# Patient Record
Sex: MALE | NOT HISPANIC OR LATINO | Employment: UNEMPLOYED | URBAN - NONMETROPOLITAN AREA
[De-identification: names, ages, dates, MRNs, and addresses within clinical notes are randomized per-mention and may not be internally consistent; named-entity substitution may affect disease eponyms.]

---

## 2023-05-19 NOTE — PROGRESS NOTES
Subjective     Lavon Emerson is a 52 y.o. male who presents for Follow-up (ER follow up/abscess).      HPI  The patient is a 52 year-old male presenting to the clinic for follow up emergency visit.  He had an abscess on the left buttock and incision and drainage was done.  Wound was examined.  Wound was healing well.  Packing was removed.  Did not appear to be repacked cleaned with Betadine.  Covered with a Band-Aid.  Wound care instructions were given.  Educated on obesity and diet and exercise.  Advised lose weight.  Discussed about skin infection.  Continue current medication.  Complaining skin rash in the armpits and groin area.  Discussed about intertrigo.  Discussed medication.  Educated on hypothyroidism.  Educated on erectile dysfunction and discussed medication.  Does not wish to go to the urologist.  Educated on dyslipidemia and diet and exercise.  Complaining feeling tired but advised on diet and exercise but educated on vitamin D deficiency.        Review of Systems  Review of systems:    General:  Denies fever.  Denies chills.    HEENT: Denies nasal congestion.  Denies sinus pressure.    Respiratory:  Denies cough.  Denies shortness of breath.    Cardiovascular:  Denies chest pain.  Denies heart palpitations.  Denies shortness of breath.    Gastrointestinal:  Denies nausea vomiting diarrhea.  Denies abdominal pain.    Genitourinary: Denies burning urination.  Denies frequent urination.  Denies flank pain.  Denies blood in the urine.     Neurology:  Denies tingling numbness but denies weakness.  Denies headache.  Denies blurred vision.    Musculoskeletal:  Denies body aches.  Denies joint pains.  Denies muscle aches.  Denies muscle weakness    Endocrinology:  Denies cold intolerance.  Denies hot intolerance.    Psychiatric:  Denies depression.  Denies anxiety.  Denies suicidal.  Denies homicidal.  Skin.  Dictated as above      Objective   /70 (BP Location: Left arm, Patient Position: Sitting, BP  "Cuff Size: Large adult)   Pulse 86   Ht 1.905 m (6' 3\")   Wt (!) 166 kg (365 lb)   SpO2 98%   BMI 45.62 kg/m²        Physical Exam  General.  Not in distress.  HEENT normocephalic anicteric sclerae.  Neck soft supple no thyromegaly.  No carotid bruit.  Lungs are clear.  Heart regular.  Abdomen soft nontender nondistended bowel sounds are positive.  Extremities no clubbing cyanosis or edema.  Psychiatric.  Has good eye contact.  No crying spells noted.  Speech was normal.  Denies depression.  Denies suicidal.  Denies homicidal.  Skin exam.  Wound dictated as above.  Skin rash in the axilla and in the groin area consistent with intertrigo    Assessment/Plan   1.  Abscess/infection.  Wound care instructions were given.  Continue current management.    2.  Obesity.  Educated on diet exercise.  Advised to lose weight.    3.  Intertrigo.  Educated on skin care.  Explained adverse effects of medication.    4.  Hypogonadism.  Dictated as above.    5.  Male erectile dysfunction.  Caution advised about medication including combination effect with nitroglycerin, priapism and sudden sensorineural hearing loss.    6.  Dyslipidemia.  Educated on diet exercise.  We will continue monitor    7.  Fatigue.  Educated on diet exercise.  Advised to lose weight.    8.  Vitamin D deficiency.  Educated on vitamin D deficiency we will continue monitor.                Problem List Items Addressed This Visit          Endocrine/Metabolic    Hypogonadism in male    Vitamin D deficiency       Other    Abscess - Primary    Chronic fatigue    Dyslipidemia     Other Visit Diagnoses       Class 3 severe obesity due to excess calories with serious comorbidity and body mass index (BMI) of 45.0 to 49.9 in adult (CMS/[facility])        Skin infection        Intertrigo        Relevant Medications    clotrimazole-betamethasone (Lotrisone) cream    Other male erectile dysfunction        Relevant Medications    tadalafil (Cialis) 2.5 mg tablet    sildenafil " (Viagra) 100 mg tablet        Scribe Attestation  By signing my name below, I, Ezio Constantino   attest that this documentation has been prepared under the direction and in the presence of Robin Caballero MD.

## 2023-05-22 ENCOUNTER — OFFICE VISIT (OUTPATIENT)
Dept: PRIMARY CARE | Facility: CLINIC | Age: 53
End: 2023-05-22
Payer: COMMERCIAL

## 2023-05-22 VITALS
SYSTOLIC BLOOD PRESSURE: 111 MMHG | DIASTOLIC BLOOD PRESSURE: 70 MMHG | WEIGHT: 315 LBS | OXYGEN SATURATION: 98 % | HEIGHT: 75 IN | HEART RATE: 86 BPM | BODY MASS INDEX: 39.17 KG/M2

## 2023-05-22 DIAGNOSIS — E66.01 CLASS 3 SEVERE OBESITY DUE TO EXCESS CALORIES WITH SERIOUS COMORBIDITY AND BODY MASS INDEX (BMI) OF 45.0 TO 49.9 IN ADULT (MULTI): ICD-10-CM

## 2023-05-22 DIAGNOSIS — L08.9 SKIN INFECTION: ICD-10-CM

## 2023-05-22 DIAGNOSIS — R53.82 CHRONIC FATIGUE: ICD-10-CM

## 2023-05-22 DIAGNOSIS — N52.8 OTHER MALE ERECTILE DYSFUNCTION: ICD-10-CM

## 2023-05-22 DIAGNOSIS — L30.4 INTERTRIGO: ICD-10-CM

## 2023-05-22 DIAGNOSIS — E78.5 DYSLIPIDEMIA: ICD-10-CM

## 2023-05-22 DIAGNOSIS — E55.9 VITAMIN D DEFICIENCY: ICD-10-CM

## 2023-05-22 DIAGNOSIS — E29.1 HYPOGONADISM IN MALE: ICD-10-CM

## 2023-05-22 DIAGNOSIS — L02.91 ABSCESS: Primary | ICD-10-CM

## 2023-05-22 PROBLEM — D51.0 PERNICIOUS ANEMIA: Status: ACTIVE | Noted: 2023-05-22

## 2023-05-22 PROBLEM — M54.50 CHRONIC LOW BACK PAIN: Status: ACTIVE | Noted: 2023-05-22

## 2023-05-22 PROBLEM — G89.29 CHRONIC LOW BACK PAIN: Status: ACTIVE | Noted: 2023-05-22

## 2023-05-22 PROBLEM — G43.909 MIGRAINES: Status: ACTIVE | Noted: 2023-05-22

## 2023-05-22 PROBLEM — S16.1XXA ACUTE STRAIN OF NECK MUSCLE: Status: ACTIVE | Noted: 2023-05-22

## 2023-05-22 PROBLEM — H93.13 TINNITUS, BILATERAL: Status: ACTIVE | Noted: 2023-05-22

## 2023-05-22 PROBLEM — G47.33 OSA (OBSTRUCTIVE SLEEP APNEA): Status: ACTIVE | Noted: 2023-05-22

## 2023-05-22 PROBLEM — K21.9 GASTROESOPHAGEAL REFLUX DISEASE: Status: ACTIVE | Noted: 2023-05-22

## 2023-05-22 PROCEDURE — 99213 OFFICE O/P EST LOW 20 MIN: CPT | Performed by: FAMILY MEDICINE

## 2023-05-22 PROCEDURE — 1036F TOBACCO NON-USER: CPT | Performed by: FAMILY MEDICINE

## 2023-05-22 PROCEDURE — 3008F BODY MASS INDEX DOCD: CPT | Performed by: FAMILY MEDICINE

## 2023-05-22 RX ORDER — SILDENAFIL 100 MG/1
100 TABLET, FILM COATED ORAL DAILY PRN
Qty: 12 TABLET | Refills: 0 | Status: SHIPPED | OUTPATIENT
Start: 2023-05-22 | End: 2024-05-21

## 2023-05-22 RX ORDER — TRAMADOL HYDROCHLORIDE 50 MG/1
50 TABLET, COATED ORAL EVERY 6 HOURS PRN
COMMUNITY
Start: 2023-05-19

## 2023-05-22 RX ORDER — CLINDAMYCIN HYDROCHLORIDE 300 MG/1
300 CAPSULE ORAL EVERY 6 HOURS
COMMUNITY
Start: 2023-05-19 | End: 2023-05-25

## 2023-05-22 RX ORDER — TADALAFIL 2.5 MG/1
2.5 TABLET ORAL DAILY
Qty: 30 TABLET | Refills: 0 | Status: SHIPPED | OUTPATIENT
Start: 2023-05-22

## 2023-05-22 RX ORDER — CLOTRIMAZOLE AND BETAMETHASONE DIPROPIONATE 10; .64 MG/G; MG/G
CREAM TOPICAL
Qty: 60 G | Refills: 0 | Status: SHIPPED | OUTPATIENT
Start: 2023-05-22

## 2023-05-22 ASSESSMENT — PAIN SCALES - GENERAL: PAINLEVEL: 0-NO PAIN

## 2023-11-07 ENCOUNTER — APPOINTMENT (OUTPATIENT)
Dept: PRIMARY CARE | Facility: CLINIC | Age: 53
End: 2023-11-07
Payer: COMMERCIAL

## 2023-11-16 ENCOUNTER — APPOINTMENT (OUTPATIENT)
Dept: PRIMARY CARE | Facility: CLINIC | Age: 53
End: 2023-11-16
Payer: COMMERCIAL

## 2023-12-08 PROBLEM — I82.409 ACUTE EMBOLISM AND THROMBOSIS OF UNSPECIFIED DEEP VEINS OF UNSPECIFIED LOWER EXTREMITY (MULTI): Status: ACTIVE | Noted: 2023-12-08

## 2023-12-08 PROBLEM — E03.9 HYPOTHYROIDISM: Status: ACTIVE | Noted: 2023-12-08

## 2023-12-08 PROBLEM — S69.90XA INJURY OF FINGER: Status: ACTIVE | Noted: 2023-12-08

## 2023-12-08 PROBLEM — T23.231A: Status: ACTIVE | Noted: 2023-12-08
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
## 2023-12-08 PROBLEM — I82.409 DEEP VENOUS THROMBOSIS (MULTI): Status: ACTIVE | Noted: 2023-12-08

## 2023-12-08 PROBLEM — R07.9 CHEST PAIN: Status: ACTIVE | Noted: 2023-12-08

## 2023-12-08 PROBLEM — S82.309A CLOSED FRACTURE OF DISTAL END OF TIBIA: Status: ACTIVE | Noted: 2023-12-08

## 2023-12-08 PROBLEM — R06.00 DYSPNEA: Status: ACTIVE | Noted: 2023-12-08

## 2023-12-08 PROBLEM — S61.411A LACERATION OF RIGHT HAND WITHOUT FOREIGN BODY: Status: ACTIVE | Noted: 2023-12-08

## 2023-12-08 PROBLEM — M79.606 PAIN OF LOWER EXTREMITY: Status: ACTIVE | Noted: 2023-12-08

## 2023-12-08 PROBLEM — S89.90XA INJURY OF LOWER EXTREMITY: Status: ACTIVE | Noted: 2023-12-08

## 2023-12-08 PROBLEM — Z86.718 HISTORY OF DEEP VEIN THROMBOSIS: Status: ACTIVE | Noted: 2023-12-08

## 2024-08-20 ENCOUNTER — APPOINTMENT (OUTPATIENT)
Dept: CARDIOLOGY | Facility: HOSPITAL | Age: 54
End: 2024-08-20

## 2024-08-20 ENCOUNTER — HOSPITAL ENCOUNTER (EMERGENCY)
Facility: HOSPITAL | Age: 54
Discharge: HOME | End: 2024-08-20
Attending: EMERGENCY MEDICINE

## 2024-08-20 ENCOUNTER — APPOINTMENT (OUTPATIENT)
Dept: RADIOLOGY | Facility: HOSPITAL | Age: 54
End: 2024-08-20

## 2024-08-20 ENCOUNTER — PHARMACY VISIT (OUTPATIENT)
Dept: PHARMACY | Facility: CLINIC | Age: 54
End: 2024-08-20

## 2024-08-20 VITALS
HEIGHT: 75 IN | RESPIRATION RATE: 16 BRPM | OXYGEN SATURATION: 97 % | WEIGHT: 315 LBS | TEMPERATURE: 97.8 F | HEART RATE: 73 BPM | DIASTOLIC BLOOD PRESSURE: 86 MMHG | SYSTOLIC BLOOD PRESSURE: 133 MMHG | BODY MASS INDEX: 39.17 KG/M2

## 2024-08-20 DIAGNOSIS — M25.512 ACUTE PAIN OF LEFT SHOULDER: Primary | ICD-10-CM

## 2024-08-20 LAB
ALBUMIN SERPL BCP-MCNC: 3.8 G/DL (ref 3.4–5)
ALP SERPL-CCNC: 69 U/L (ref 33–120)
ALT SERPL W P-5'-P-CCNC: 16 U/L (ref 10–52)
ANION GAP SERPL CALC-SCNC: 12 MMOL/L (ref 10–20)
APPEARANCE UR: CLEAR
AST SERPL W P-5'-P-CCNC: 17 U/L (ref 9–39)
BASOPHILS # BLD AUTO: 0.02 X10*3/UL (ref 0–0.1)
BASOPHILS NFR BLD AUTO: 0.3 %
BILIRUB SERPL-MCNC: 0.5 MG/DL (ref 0–1.2)
BILIRUB UR STRIP.AUTO-MCNC: NEGATIVE MG/DL
BUN SERPL-MCNC: 23 MG/DL (ref 6–23)
CALCIUM SERPL-MCNC: 9.1 MG/DL (ref 8.6–10.3)
CARDIAC TROPONIN I PNL SERPL HS: 3 NG/L (ref 0–20)
CARDIAC TROPONIN I PNL SERPL HS: 3 NG/L (ref 0–20)
CHLORIDE SERPL-SCNC: 104 MMOL/L (ref 98–107)
CO2 SERPL-SCNC: 26 MMOL/L (ref 21–32)
COLOR UR: NORMAL
CREAT SERPL-MCNC: 1.21 MG/DL (ref 0.5–1.3)
D DIMER PPP FEU-MCNC: 572 NG/ML FEU
EGFRCR SERPLBLD CKD-EPI 2021: 71 ML/MIN/1.73M*2
EOSINOPHIL # BLD AUTO: 0.15 X10*3/UL (ref 0–0.7)
EOSINOPHIL NFR BLD AUTO: 2.3 %
ERYTHROCYTE [DISTWIDTH] IN BLOOD BY AUTOMATED COUNT: 13.1 % (ref 11.5–14.5)
GLUCOSE SERPL-MCNC: 124 MG/DL (ref 74–99)
GLUCOSE UR STRIP.AUTO-MCNC: NORMAL MG/DL
HCT VFR BLD AUTO: 45.5 % (ref 41–52)
HGB BLD-MCNC: 15 G/DL (ref 13.5–17.5)
HOLD SPECIMEN: NORMAL
IMM GRANULOCYTES # BLD AUTO: 0.03 X10*3/UL (ref 0–0.7)
IMM GRANULOCYTES NFR BLD AUTO: 0.5 % (ref 0–0.9)
KETONES UR STRIP.AUTO-MCNC: NEGATIVE MG/DL
LEUKOCYTE ESTERASE UR QL STRIP.AUTO: NEGATIVE
LYMPHOCYTES # BLD AUTO: 2.55 X10*3/UL (ref 1.2–4.8)
LYMPHOCYTES NFR BLD AUTO: 39.5 %
MAGNESIUM SERPL-MCNC: 2.02 MG/DL (ref 1.6–2.4)
MCH RBC QN AUTO: 33.7 PG (ref 26–34)
MCHC RBC AUTO-ENTMCNC: 33 G/DL (ref 32–36)
MCV RBC AUTO: 102 FL (ref 80–100)
MONOCYTES # BLD AUTO: 0.66 X10*3/UL (ref 0.1–1)
MONOCYTES NFR BLD AUTO: 10.2 %
NEUTROPHILS # BLD AUTO: 3.04 X10*3/UL (ref 1.2–7.7)
NEUTROPHILS NFR BLD AUTO: 47.2 %
NITRITE UR QL STRIP.AUTO: NEGATIVE
NRBC BLD-RTO: 0 /100 WBCS (ref 0–0)
PH UR STRIP.AUTO: 6.5 [PH]
PLATELET # BLD AUTO: 251 X10*3/UL (ref 150–450)
POTASSIUM SERPL-SCNC: 4.3 MMOL/L (ref 3.5–5.3)
PROT SERPL-MCNC: 6.9 G/DL (ref 6.4–8.2)
PROT UR STRIP.AUTO-MCNC: NEGATIVE MG/DL
RBC # BLD AUTO: 4.45 X10*6/UL (ref 4.5–5.9)
RBC # UR STRIP.AUTO: NEGATIVE /UL
SODIUM SERPL-SCNC: 138 MMOL/L (ref 136–145)
SP GR UR STRIP.AUTO: 1.02
UROBILINOGEN UR STRIP.AUTO-MCNC: NORMAL MG/DL
WBC # BLD AUTO: 6.5 X10*3/UL (ref 4.4–11.3)

## 2024-08-20 PROCEDURE — 93971 EXTREMITY STUDY: CPT

## 2024-08-20 PROCEDURE — 93971 EXTREMITY STUDY: CPT | Performed by: RADIOLOGY

## 2024-08-20 PROCEDURE — 96374 THER/PROPH/DIAG INJ IV PUSH: CPT

## 2024-08-20 PROCEDURE — 99285 EMERGENCY DEPT VISIT HI MDM: CPT | Mod: 25

## 2024-08-20 PROCEDURE — 84075 ASSAY ALKALINE PHOSPHATASE: CPT | Performed by: EMERGENCY MEDICINE

## 2024-08-20 PROCEDURE — 84484 ASSAY OF TROPONIN QUANT: CPT | Performed by: EMERGENCY MEDICINE

## 2024-08-20 PROCEDURE — 93005 ELECTROCARDIOGRAM TRACING: CPT

## 2024-08-20 PROCEDURE — 36415 COLL VENOUS BLD VENIPUNCTURE: CPT | Performed by: EMERGENCY MEDICINE

## 2024-08-20 PROCEDURE — 81003 URINALYSIS AUTO W/O SCOPE: CPT | Performed by: EMERGENCY MEDICINE

## 2024-08-20 PROCEDURE — 83735 ASSAY OF MAGNESIUM: CPT | Performed by: EMERGENCY MEDICINE

## 2024-08-20 PROCEDURE — RXMED WILLOW AMBULATORY MEDICATION CHARGE

## 2024-08-20 PROCEDURE — 96375 TX/PRO/DX INJ NEW DRUG ADDON: CPT

## 2024-08-20 PROCEDURE — 85025 COMPLETE CBC W/AUTO DIFF WBC: CPT | Performed by: EMERGENCY MEDICINE

## 2024-08-20 PROCEDURE — 85379 FIBRIN DEGRADATION QUANT: CPT | Performed by: EMERGENCY MEDICINE

## 2024-08-20 PROCEDURE — 2500000004 HC RX 250 GENERAL PHARMACY W/ HCPCS (ALT 636 FOR OP/ED): Performed by: EMERGENCY MEDICINE

## 2024-08-20 RX ORDER — DEXAMETHASONE SODIUM PHOSPHATE 10 MG/ML
10 INJECTION INTRAMUSCULAR; INTRAVENOUS ONCE
Status: COMPLETED | OUTPATIENT
Start: 2024-08-20 | End: 2024-08-20

## 2024-08-20 RX ORDER — DEXAMETHASONE 6 MG/1
12 TABLET ORAL DAILY
Qty: 2 TABLET | Refills: 0 | Status: SHIPPED | OUTPATIENT
Start: 2024-08-20 | End: 2024-08-21

## 2024-08-20 RX ORDER — KETOROLAC TROMETHAMINE 15 MG/ML
15 INJECTION, SOLUTION INTRAMUSCULAR; INTRAVENOUS ONCE
Status: COMPLETED | OUTPATIENT
Start: 2024-08-20 | End: 2024-08-20

## 2024-08-20 ASSESSMENT — PAIN SCALES - GENERAL
PAINLEVEL_OUTOF10: 8
PAINLEVEL_OUTOF10: 5 - MODERATE PAIN

## 2024-08-20 ASSESSMENT — PAIN DESCRIPTION - LOCATION
LOCATION: SHOULDER
LOCATION: SHOULDER

## 2024-08-20 ASSESSMENT — PAIN DESCRIPTION - PAIN TYPE: TYPE: ACUTE PAIN

## 2024-08-20 ASSESSMENT — PAIN DESCRIPTION - ORIENTATION
ORIENTATION: LEFT
ORIENTATION: LEFT

## 2024-08-20 ASSESSMENT — PAIN - FUNCTIONAL ASSESSMENT: PAIN_FUNCTIONAL_ASSESSMENT: 0-10

## 2024-08-20 ASSESSMENT — COLUMBIA-SUICIDE SEVERITY RATING SCALE - C-SSRS
6. HAVE YOU EVER DONE ANYTHING, STARTED TO DO ANYTHING, OR PREPARED TO DO ANYTHING TO END YOUR LIFE?: NO
2. HAVE YOU ACTUALLY HAD ANY THOUGHTS OF KILLING YOURSELF?: NO
1. IN THE PAST MONTH, HAVE YOU WISHED YOU WERE DEAD OR WISHED YOU COULD GO TO SLEEP AND NOT WAKE UP?: NO

## 2024-08-20 ASSESSMENT — PAIN DESCRIPTION - DESCRIPTORS: DESCRIPTORS: DISCOMFORT

## 2024-08-20 NOTE — Clinical Note
Lavon Ki was seen and treated in our emergency department on 8/20/2024.  He may return to work on 08/21/2024.       If you have any questions or concerns, please don't hesitate to call.      Nikki POE MD

## 2024-08-20 NOTE — PROGRESS NOTES
Emergency Medicine Transition of Care Note.    I received Lavon Emerson in signout from Dr. CAMARENA.  Please see the previous ED provider note for all HPI, PE and MDM up to the time of signout at 0700. This is in addition to the primary record.    In brief Lavon Emerson is an 54 y.o. male presenting for   Chief Complaint   Patient presents with    Shoulder Pain     Pain on entire left side to touch     At the time of signout we were awaiting: US    ED Course as of 08/20/24 1451   Tue Aug 20, 2024   0655 EKG obtained at 633, interpreted by myself.  Normal sinus rhythm with a ventricular rate of 71, no axis deviation, normal intervals, with no acute ischemic changes [VT]   0805 EKG performed at 805 normal sinus rhythm ventricular rate of 66 no ST elevation or depression interpreted by me. [KA]      ED Course User Index  [KA] Kulwinder Willoughby DO  [VT] Nikki POE MD         Diagnoses as of 08/20/24 1451   Acute pain of left shoulder       Medical Decision Making      Final diagnoses:   [M25.512] Acute pain of left shoulder     54-year-old male presents to the ER with chief complaint of left arm shoulder pain.  Patient was worked up from a cardiac standpoint workup is negative.  Plan was to discharge patient home and he did ask me about his leg did feel abnormal this morning but that was only briefly and that resolved.  I do not think it is a stroke.  However it was going to be precautions to get an MRI.  Patient does not fit in our MRI machine.  Plan is to discharge the patient home and follow-up with the specialist patient would like to be discharged home he does not want to be transferred.      Procedure  Procedures    Kulwinder Willoughby DO

## 2024-08-20 NOTE — ED PROVIDER NOTES
"HPI   Chief Complaint   Patient presents with    Shoulder Pain     Pain on entire left side to touch       HPI  Patient is a 54-year-old male presenting to the ED today for pain to the left side of his body.  Patient explains that he has had pain to the left side of his body all night tonight, keeping him up from sleep.  Patient describes pain in his left shoulder, left neck, and left leg.  Patient notes that when he got into the shower this morning, his left leg felt like it was \"on fire\" from the water hitting it, prompting him to come to the ED for further evaluation.  He notes that last time he had similar symptoms several years ago, he was diagnosed with cellulitis.  He does report associated generalized weakness.  He otherwise denies any fever, chest pain, shortness of breath, abdominal pain, vomiting, diarrhea, numbness, or weakness.      Patient History   No past medical history on file.  Past Surgical History:   Procedure Laterality Date    MR HEAD ANGIO WO IV CONTRAST  5/31/2015    MR HEAD ANGIO WO IV CONTRAST [facility]    OTHER SURGICAL HISTORY  01/04/2022    Kidney surgery    OTHER SURGICAL HISTORY  03/02/2022    Knee surgery    OTHER SURGICAL HISTORY  03/02/2022    Hand surgery    OTHER SURGICAL HISTORY  03/02/2022    Back surgery    OTHER SURGICAL HISTORY  03/02/2022    Leg surgery    OTHER SURGICAL HISTORY  03/02/2022    Appendectomy     No family history on file.  Social History     Tobacco Use    Smoking status: Former     Current packs/day: 0.25     Average packs/day: 0.3 packs/day for 0.5 years (0.1 ttl pk-yrs)     Types: Cigarettes    Smokeless tobacco: Never   Vaping Use    Vaping status: Never Used   Substance Use Topics    Alcohol use: Not Currently    Drug use: Never       Physical Exam   ED Triage Vitals [08/20/24 0639]   Temperature Heart Rate Respirations BP   36.6 °C (97.8 °F) 71 18 155/80      Pulse Ox Temp Source Heart Rate Source Patient Position   97 % Axillary Monitor " Lying      BP Location FiO2 (%)     Left arm --       Physical Exam  Vitals and nursing note reviewed.   Constitutional:       General: He is not in acute distress.     Appearance: He is not toxic-appearing.   HENT:      Head: Normocephalic.      Mouth/Throat:      Mouth: Mucous membranes are moist.   Eyes:      Extraocular Movements: Extraocular movements intact.      Conjunctiva/sclera: Conjunctivae normal.   Cardiovascular:      Rate and Rhythm: Normal rate and regular rhythm.      Pulses: Normal pulses.      Comments: Tenderness to palpation of the left anterior chest wall  Pulmonary:      Effort: Pulmonary effort is normal. No respiratory distress.      Breath sounds: Normal breath sounds. No wheezing.   Abdominal:      General: There is no distension.      Palpations: Abdomen is soft.      Tenderness: There is no abdominal tenderness. There is no guarding or rebound.   Musculoskeletal:         General: No swelling.      Cervical back: Neck supple.      Comments: Tenderness to palpation of the proximal left upper extremity and left calf. No erythema or swelling. 2+ distal pulses in all 4 extremities. Normal sensation to light touch.   Skin:     General: Skin is warm and dry.      Capillary Refill: Capillary refill takes less than 2 seconds.   Neurological:      General: No focal deficit present.      Mental Status: He is alert. Mental status is at baseline.      Comments: This patient is awake, alert and oriented to person, place and time. Speech is clear and fluent. Cranial nerves II-XII are grossly intact. Strength and sensation are intact in all extremities.            ED Course & MDM   ED Course as of 08/20/24 0656   Tue Aug 20, 2024   0655 EKG obtained at 633, interpreted by myself.  Normal sinus rhythm with a ventricular rate of 71, no axis deviation, normal intervals, with no acute ischemic changes [VT]      ED Course User Index  [VT] Nikki POE MD                 No data recorded     [place] Coma  Scale Score: 15 (08/20/24 0642 : Sunni Suttno RN)                           Medical Decision Making  Patient was seen and evaluated for paresthesia to the left side of his body.  On arrival, vital signs are unremarkable.  On exam, patient has no focal neurological deficits.  Additional labwork and imaging are ordered for further evaluation of the patient's symptoms.    At this time, patient's work-up remains pending.  Patient will be signed out to the oncoming physician, Dr. Willoughby, pending the results of his work-up and further disposition.    Procedure  Procedures     Nikki POE MD  08/20/24 0656

## 2024-08-20 NOTE — ED TRIAGE NOTES
Patient comes in with left shoulder, arm, neck and leg paraesthesia that started when he woke up this morning. He was driving to work and started to feel like he has cellulitis flare up.  Denies any chest pain and sob.

## 2024-08-23 LAB
ATRIAL RATE: 66 BPM
ATRIAL RATE: 71 BPM
P AXIS: 15 DEGREES
P AXIS: 24 DEGREES
P OFFSET: 180 MS
P OFFSET: 191 MS
P ONSET: 118 MS
P ONSET: 130 MS
PR INTERVAL: 188 MS
PR INTERVAL: 208 MS
Q ONSET: 222 MS
Q ONSET: 224 MS
QRS COUNT: 11 BEATS
QRS COUNT: 12 BEATS
QRS DURATION: 92 MS
QRS DURATION: 92 MS
QT INTERVAL: 376 MS
QT INTERVAL: 402 MS
QTC CALCULATION(BAZETT): 408 MS
QTC CALCULATION(BAZETT): 421 MS
QTC FREDERICIA: 397 MS
QTC FREDERICIA: 415 MS
R AXIS: 25 DEGREES
R AXIS: 9 DEGREES
T AXIS: 35 DEGREES
T AXIS: 37 DEGREES
T OFFSET: 412 MS
T OFFSET: 423 MS
VENTRICULAR RATE: 66 BPM
VENTRICULAR RATE: 71 BPM

## 2024-09-03 ENCOUNTER — APPOINTMENT (OUTPATIENT)
Dept: ORTHOPEDIC SURGERY | Facility: CLINIC | Age: 54
End: 2024-09-03

## 2025-05-27 ENCOUNTER — OFFICE VISIT (OUTPATIENT)
Dept: ORTHOPEDIC SURGERY | Facility: CLINIC | Age: 55
End: 2025-05-27
Payer: COMMERCIAL

## 2025-05-27 ENCOUNTER — HOSPITAL ENCOUNTER (OUTPATIENT)
Dept: RADIOLOGY | Facility: CLINIC | Age: 55
Discharge: HOME | End: 2025-05-27
Payer: COMMERCIAL

## 2025-05-27 DIAGNOSIS — M25.511 BILATERAL SHOULDER PAIN, UNSPECIFIED CHRONICITY: ICD-10-CM

## 2025-05-27 DIAGNOSIS — M25.512 BILATERAL SHOULDER PAIN, UNSPECIFIED CHRONICITY: ICD-10-CM

## 2025-05-27 DIAGNOSIS — M25.511 BILATERAL SHOULDER PAIN, UNSPECIFIED CHRONICITY: Primary | ICD-10-CM

## 2025-05-27 DIAGNOSIS — M75.42 IMPINGEMENT SYNDROME OF LEFT SHOULDER: ICD-10-CM

## 2025-05-27 DIAGNOSIS — M25.512 BILATERAL SHOULDER PAIN, UNSPECIFIED CHRONICITY: Primary | ICD-10-CM

## 2025-05-27 PROCEDURE — 20611 DRAIN/INJ JOINT/BURSA W/US: CPT | Mod: LT | Performed by: ORTHOPAEDIC SURGERY

## 2025-05-27 PROCEDURE — 1036F TOBACCO NON-USER: CPT | Performed by: ORTHOPAEDIC SURGERY

## 2025-05-27 PROCEDURE — 99213 OFFICE O/P EST LOW 20 MIN: CPT | Performed by: ORTHOPAEDIC SURGERY

## 2025-05-27 PROCEDURE — 2500000004 HC RX 250 GENERAL PHARMACY W/ HCPCS (ALT 636 FOR OP/ED): Performed by: ORTHOPAEDIC SURGERY

## 2025-05-27 PROCEDURE — 99213 OFFICE O/P EST LOW 20 MIN: CPT | Mod: 25 | Performed by: ORTHOPAEDIC SURGERY

## 2025-05-27 PROCEDURE — 73030 X-RAY EXAM OF SHOULDER: CPT | Mod: 50

## 2025-05-27 PROCEDURE — 73030 X-RAY EXAM OF SHOULDER: CPT | Mod: BILATERAL PROCEDURE | Performed by: STUDENT IN AN ORGANIZED HEALTH CARE EDUCATION/TRAINING PROGRAM

## 2025-05-27 RX ADMIN — LIDOCAINE HYDROCHLORIDE 3 ML: 10 INJECTION, SOLUTION INFILTRATION; PERINEURAL at 14:30

## 2025-05-27 RX ADMIN — TRIAMCINOLONE ACETONIDE 40 MG: 40 INJECTION, SUSPENSION INTRA-ARTICULAR; INTRAMUSCULAR at 14:30

## 2025-05-27 ASSESSMENT — PAIN - FUNCTIONAL ASSESSMENT: PAIN_FUNCTIONAL_ASSESSMENT: 0-10

## 2025-05-27 ASSESSMENT — COLUMBIA-SUICIDE SEVERITY RATING SCALE - C-SSRS
1. IN THE PAST MONTH, HAVE YOU WISHED YOU WERE DEAD OR WISHED YOU COULD GO TO SLEEP AND NOT WAKE UP?: NO
6. HAVE YOU EVER DONE ANYTHING, STARTED TO DO ANYTHING, OR PREPARED TO DO ANYTHING TO END YOUR LIFE?: NO
2. HAVE YOU ACTUALLY HAD ANY THOUGHTS OF KILLING YOURSELF?: NO

## 2025-05-27 ASSESSMENT — PAIN SCALES - GENERAL: PAINLEVEL_OUTOF10: 10 - WORST POSSIBLE PAIN

## 2025-05-28 RX ORDER — LIDOCAINE HYDROCHLORIDE 10 MG/ML
3 INJECTION, SOLUTION INFILTRATION; PERINEURAL
Status: COMPLETED | OUTPATIENT
Start: 2025-05-27 | End: 2025-05-27

## 2025-05-28 RX ORDER — TRIAMCINOLONE ACETONIDE 40 MG/ML
40 INJECTION, SUSPENSION INTRA-ARTICULAR; INTRAMUSCULAR
Status: COMPLETED | OUTPATIENT
Start: 2025-05-27 | End: 2025-05-27

## 2025-05-28 ASSESSMENT — ENCOUNTER SYMPTOMS
FATIGUE: 0
BRUISES/BLEEDS EASILY: 0
CHILLS: 0
NUMBNESS: 0
WHEEZING: 0
SHORTNESS OF BREATH: 0
FEVER: 0

## 2025-05-28 NOTE — PROGRESS NOTES
Reason for Appointment  Chief Complaint   Patient presents with    Left Shoulder - Pain    Right Shoulder - Pain     History of Present Illness  Patient is a 54 y.o. male here today for follow-up evaluation of left shoulder recurrent pain but also some mild right shoulder pain.  It has been almost 2 years since the last left subacromial injection for bursitis she has got recurrent symptoms he still does a lot of lifting I cautioned him on heavy lifting and proper techniques.  No radicular symptoms with pain with overhead elevation classic bursitis.  No biceps symptoms mild in the right shoulder.  Repeat x-rays on 527 show mild bilateral AC joint degeneration but no significant glenohumeral degeneration.  No recent falls or significant lifting injuries no changes in his history full history reviewed.     Medical History[1]    Surgical History[2]    Medication Documentation Review Audit       Reviewed by Chris Norris MD (Physician) on 25 at 0614      Medication Order Taking? Sig Documenting Provider Last Dose Status   clotrimazole-betamethasone (Lotrisone) cream [id number]  APPLY TWICE DAILY Robin Caballero MD  Active   dexAMETHasone (Decadron) 6 mg tablet [id number]  Take 2 tablets (12 mg) by mouth once daily for 1 day. Take with food Kulwinder Willoughby,    24 235   sildenafil (Viagra) 100 mg tablet [id number]  Take 1 tablet (100 mg) by mouth once daily as needed for erectile dysfunction. Robin Caballero MD   24 2359   tadalafil (Cialis) 2.5 mg tablet [id number]  Take 1 tablet (2.5 mg) by mouth once daily. Robin Caballero MD  Active   Ultram 50 mg tablet [id number] No Take 1 tablet (50 mg) by mouth every 6 hours if needed. Historical Provider, MD Taking Active                    RX Allergies[3]    Review of Systems   Constitutional:  Negative for chills, fatigue and fever.   Respiratory:  Negative for shortness of breath and wheezing.    Cardiovascular:  Negative for chest pain  and leg swelling.   Allergic/Immunologic: Negative for immunocompromised state.   Neurological:  Negative for numbness.   Hematological:  Does not bruise/bleed easily.       Exam   Exam reveals symmetric motion but he does have some global stiffness in his shoulders large gentleman drives a concrete truck.  Both shoulders show positive impingement sign but much more on the left than the right.  No significant biceps tenderness but there is mild tenderness left greater than the right overall cuff strength is maintained good pulses good sensation good deltoid function otherwise  Assessment   Encounter Diagnosis   Name Primary?    Bilateral shoulder pain, unspecified chronicity Yes   Bilateral shoulder impingement syndrome left greater than the right    Plan   We sterilely injected under ultrasound today.  No significant full-thickness tear was seen on ultrasound he understands long-term the risk of repeated injection but its only had a couple injections and it has been 2 years between injections.  Hopefully this will give him longstanding relief and we will check him back when symptoms recurPatient ID: Lavon Emerson is a 54 y.o. male.    L Inj/Asp: L subacromial bursa on 5/27/2025 2:30 PM  Indications: pain  Details: 22 G needle, ultrasound-guided  Medications: 40 mg triamcinolone acetonide 40 mg/mL; 3 mL lidocaine 10 mg/mL (1 %)  Outcome: tolerated well, no immediate complications    After discussing the risks and benefits of the procedure with proceeded with an injection.  Using ultrasound guidance we identified the acromion, humeral head and the subacromial bursa, images obtained and saved. We then sterilely injected the left subacrominal space with a mixture of 40 mg of Kenalog and 1 cc of 1 % lidocaine. Pt tolerated the procedure well without any adverse reactions.   Procedure, treatment alternatives, risks and benefits explained, specific risks discussed. Consent was given by the patient. Immediately prior  to procedure a time out was called to verify the correct patient, procedure, equipment, support staff and site/side marked as required. Patient was prepped and draped in the usual sterile fashion.                              [1] History reviewed. No pertinent past medical history.  [2]   Past Surgical History:  Procedure Laterality Date    MR HEAD ANGIO WO IV CONTRAST  5/31/2015    MR HEAD ANGIO WO IV CONTRAST [facility]    OTHER SURGICAL HISTORY  01/04/2022    Kidney surgery    OTHER SURGICAL HISTORY  03/02/2022    Knee surgery    OTHER SURGICAL HISTORY  03/02/2022    Hand surgery    OTHER SURGICAL HISTORY  03/02/2022    Back surgery    OTHER SURGICAL HISTORY  03/02/2022    Leg surgery    OTHER SURGICAL HISTORY  03/02/2022    Appendectomy   [3] No Known Allergies